# Patient Record
Sex: FEMALE | URBAN - METROPOLITAN AREA
[De-identification: names, ages, dates, MRNs, and addresses within clinical notes are randomized per-mention and may not be internally consistent; named-entity substitution may affect disease eponyms.]

---

## 2023-05-15 ENCOUNTER — APPOINTMENT (RX ONLY)
Dept: URBAN - METROPOLITAN AREA CLINIC 146 | Facility: CLINIC | Age: 58
Setting detail: DERMATOLOGY
End: 2023-05-15

## 2023-05-15 DIAGNOSIS — Z41.9 ENCOUNTER FOR PROCEDURE FOR PURPOSES OTHER THAN REMEDYING HEALTH STATE, UNSPECIFIED: ICD-10-CM

## 2023-05-15 PROCEDURE — ? CONSULTATION: VEIN REMOVAL

## 2023-05-15 NOTE — PROCEDURE: CONSULTATION: VEIN REMOVAL
Right Leg Venous Edema: 2- Afternoon edema above ankle
Left Leg Ulcer Duration: 0- None
Right Leg Pain: 2- Daily, moderate activity limitation, occasional analgesics
Left Dorsalis Pedis Pulse: 2 (Easily palpable)
Left Leg Venous Hyperpigmentation: 0- None or focal low intensity (tan)
Left Leg: Peripheral Vascular Disease?: No
Include Left Vcss In Note: Yes
Left Leg Circumference: medium
Left Leg Varicose Veins: 2- Multiple: GS varicose veins confined to calf or thigh
Right Leg Compression Therapy: 3- Full compliance: stockings and elevation
Detail Level: Simple

## 2023-05-15 NOTE — HPI: VEIN EVALUATION
Which Leg Is Worse?: Equally Affected
How Severe Is/Are Your Symptoms?: moderate
Is This A New Presentation, Or A Follow-Up?: Vein Evaluation